# Patient Record
Sex: MALE | Race: WHITE | NOT HISPANIC OR LATINO | Employment: OTHER | URBAN - METROPOLITAN AREA
[De-identification: names, ages, dates, MRNs, and addresses within clinical notes are randomized per-mention and may not be internally consistent; named-entity substitution may affect disease eponyms.]

---

## 2022-09-08 ENCOUNTER — TELEPHONE (OUTPATIENT)
Dept: SURGERY | Facility: CLINIC | Age: 62
End: 2022-09-08

## 2022-09-08 DIAGNOSIS — Z12.11 SCREENING FOR COLON CANCER: Primary | ICD-10-CM

## 2022-09-08 RX ORDER — SODIUM CHLORIDE 0.9 % (FLUSH) 0.9 %
3 SYRINGE (ML) INJECTION
Status: CANCELLED | OUTPATIENT
Start: 2022-09-08

## 2022-09-08 NOTE — TELEPHONE ENCOUNTER
The patient has been advised the Colonoscopy Prep Kit will be ordered from the patient's verified preferred pharmacy on file. The medication can  be picked up by the patient, or the patient's designated representative. The patient was further explained; Colonoscopy Prep instructions will be mailed to the patient verified address on file. The patient has been advised to follow the Colonoscopy Prep instructions being mailed;as precise meticulously as possible. Patient was advise you  will receive a follow up phone call; to summarize the Colonoscopy Prep instructions, prior to the scheduled Colonoscopy procedure date. At this time the patient will be given an opportunity to ask any questions regarding the Colonoscopy Prep and associated Colonoscopy procedure. procedure.

## 2022-09-08 NOTE — TELEPHONE ENCOUNTER
Called patient in reference to a referral to Colorectal Surgery for colon cancer screening. Patient verbally consented to a Colonoscopy and requested to be scheduled for a Colonoscopy on 11/14/2022 - Pending Cardiac Clearance -Patient was advised a designated  is required on the day of the Colonoscopy to drive the patient home and the  must be at least. 18 years old.Colonoscopy Prep instructions were thoroughly explained and discussed with the patient.   It was emphasized, and reiterated to the patient, not to follow the prep instructions that comes with the Prep Kit. However, to please follow the prep instructions that will be received in the mail from the [facility]   Patient acknowledges understanding Prep instructions as explained and discussed on the phone.. Patient was advised the Colonoscopy Prep instructions discussed and explained on the phone,are being mailed out to the patient's verified address on file. Patient's address on file was verified with the patient for accuracy of mailing. Patient's medications on file was reviewed with the patient for accuracy of information. Patient denies taking  any other medications other than those listed and verified on medication profile.Patient was explained the Colonoscopy will be performed here at [facility]. Patient was further explained the Pre-Op will call one day prior to the procedure date, to discuss Pre-Op instructions;and what time to report for the Colonoscopy. The patient was given the opportunity to ask any questions about the Colonoscopy.    Bowel Prep/SUPREP instructions                                               [facility]    [address] [place] [address]      You are scheduled for a Colonoscopy with _______________________ on ____________________   At [facility] in [place].    Check in at the hospital on 1st floor Registration area next to Emergency room.    Please  call [phone] to reschedule or if you have any questions.    An adult friend/family member must come with you to drive you home.  You cannot drive, take a taxi, Uber/Lyft or bus to leave the Hospital alone. If you do not have someone with you to drive you home, your test will be cancelled.       Please follow the directions of your doctor if you take any pills that thin your blood.  If you take these meds: Aggrenox, Brilinta, Effient, Eliquis, Lovenox, Plavix, Pletal Pradaxa ticilid, Xarelto, or Coumadin, let the doctor's office know.    Don't: On the morning of the test do not take insulin or pills for diabetes.   Do: On the morning of the test, do take any pills for blood pressure, heart, anti-rejection and or seizures with a small sip of water. Bring any inhalers with you day of procedure.    To have a good prep, you must follow these instructions- please do not use the directions from the pharmacy!      The doctor will send a prescription for the SUPREP   The day Before the test:   You can only drink CLEAR LIQUIDS the whole day before your test. You can't eat any food for the whole day.    You CAN have:   Water,Coffee or decaf coffee (no milk or cream)    Tea   Soft drinks- regular and sugar free   Jell-O (green or yellow)   Apple Juice, grape juice, white cranberry juice   Gatorade, Power Aid, Crystal Light, Fuad Aid   Lemonade and Limeade   Bouillon, clear soup   Snowball, popsicles   YOU CAN'T DRINK ANYTHING RED   YOU CAN'T DRINK ALCOHOL   ONLY DRINK WHAT IS ON THIS List      At 5pm the night before your test:   Pour the 1st bottle of SUPREP into the cup provided in the box.  Add water to the line on the cup and mix well. Drink the whole cup and then drink 2 more full cups of water over the 1 hour.    This can be easier to drink if it is cold.  You can mix it 20 minutes ahead of time and place in the refrigerator before you drink it.  You must drink it within 30-45 minutes of mixing it. Do NOT pour the  drink over ice. You can drink it with a straw.    The Day of the test- We will call you 1 day before your test to tell you what time to get there.    5 hours before you come to the hospital (this may be the middle of the night)   Pour the 2nd bottle of SUPREP into to the cup provided in the box. Add water to the line on the cup and mix well. Drink the whole cup and then drink 2 more full cups of water over 1 hour.    It might be easier to drink if it is cold. You can mix it 20 minutes ahead of time and place in the refrigerator before you drink it. You must drink it within 30-45 minutes of mixing it. Do NOT pour the drink over ice. You can drink it with a straw.   YOU CAN'T EAT OR DRINK ANYTHING ELSE ONCE YOU FINISH THE PREP.   Leave all valuables and jewelry at home. You will be at the hospital for 2-4 hours.    Call the Endoscopy Scheduling Department at [phone] with any questions about your procedure.    Please  your medication from your local pharmacy. If you are unable to  the SUPREP Kit please contact our office.   Thank you   Endo Scheduling Dept   [facility]

## 2022-09-09 RX ORDER — SODIUM, POTASSIUM,MAG SULFATES 17.5-3.13G
1 SOLUTION, RECONSTITUTED, ORAL ORAL DAILY
Qty: 1 KIT | Refills: 0 | Status: SHIPPED | OUTPATIENT
Start: 2022-09-09 | End: 2022-09-11

## 2022-10-28 ENCOUNTER — TELEPHONE (OUTPATIENT)
Dept: SURGERY | Facility: CLINIC | Age: 62
End: 2022-10-28

## 2022-10-28 NOTE — TELEPHONE ENCOUNTER
Dr. Pebbles Dooley MD    Please provide us with written Cardiac Clearance on Mr Abhinav Kasper.  : 1960.        Please send any test results such as : EKG, Holter Monitor, Echocardiogram and or Stress tests.      Patient will be scheduled for a Colonoscopy  under General/MAC anesthesia scheduled for 2022.  Patient is taking Xarelto 20 MG t=Tab PO daily. Please ascribe to how this patient is to take the afore listed medication Pre,and Post Colonoscopy procedure. Additionally, this patient is noted to have the following Cardio/Vasc- Medical HX:  5 items  Date Unknown  CHF (congestive heart failure)  Date Unknown  Coronary artery disease  Date Unknown  Diabetes mellitus  Date Unknown  Hypertension  Date Unknown  Presence of combination internal cardiac defibrillator (ICD) and pacemaker      Please fax the Clearance results to [phone]  Thank you for your help in this matter.    Sincerely,  Srini Maria  Phone- [phone]  Fax- [phone]

## 2022-11-07 ENCOUNTER — DOCUMENTATION ONLY (OUTPATIENT)
Dept: SURGERY | Facility: CLINIC | Age: 62
End: 2022-11-07

## 2022-11-07 RX ORDER — SODIUM, POTASSIUM,MAG SULFATES 17.5-3.13G
1 SOLUTION, RECONSTITUTED, ORAL ORAL DAILY
Qty: 1 KIT | Refills: 0 | Status: SHIPPED | OUTPATIENT
Start: 2022-11-07 | End: 2022-11-09

## 2022-11-07 NOTE — PROGRESS NOTES
A follow up call was paced to Dr Soumya Rogel's office regarding a cardiac clearance request for this patient to proceed with the scheduled Colonoscopy. I spoke withTae who stated  is out of the office at this time,and scheduled to return on Friday. However Efe will assist with working on getting the cardiac clearance for this patient.

## 2022-11-10 ENCOUNTER — TELEPHONE (OUTPATIENT)
Dept: GASTROENTEROLOGY | Facility: CLINIC | Age: 62
End: 2022-11-10

## 2022-11-11 ENCOUNTER — TELEPHONE (OUTPATIENT)
Dept: SURGERY | Facility: CLINIC | Age: 62
End: 2022-11-11

## 2022-11-11 ENCOUNTER — DOCUMENTATION ONLY (OUTPATIENT)
Dept: SURGERY | Facility: CLINIC | Age: 62
End: 2022-11-11

## 2022-11-11 NOTE — TELEPHONE ENCOUNTER
A follow up phone call was placed to Dr Krissy Rogel's office, re: a request for Cardiac Clearance on this patient,to proceed with the scheduled Colonoscopy on 11/14/2022. I spoke with WINSTON Kennedy who advised Dr Rogel just returned to the office today,and is currently seeing patients. Marcia further added the Cardiac Clearance request is on Dr Rogel's desk,and it will be addressed by Dr Rogel later today. Jaime verified the fax number location here at [facility]  as [phone].

## 2022-11-11 NOTE — PROGRESS NOTES
A phone call was was returned to this patient regarding a time to report for the scheduled Colonoscopy. It was explained to this patient the tentative time to report for the Colonoscopy is for 8:00 am; pending the Cardiac Clearance have been received from Dr Krissy Rogel MD. Additionally it was explained to this patient if the Cardiac Clearance is not received from Dr Rogel, the Colonoscopy will need to be rescheduled. The patient acknowledged understanding of the information discussed regarding the Colonoscopy and the Cardiac Clearance needed to proceed with it.

## 2022-11-11 NOTE — TELEPHONE ENCOUNTER
A call was placed to this patient to advise the Cardiac Clearance was received from Dr Edil Rogel,'s office to proceed with the Colonoscopy scheduled for 11/14/2022. This patient was instructed as follows:Hold XARELTO 3  days before procedure; resume Asap after procedure. The patient was able to repeat the instructions accurately times(2) two,and acknowledge understanding of the instructions. Additionally, this patient was advised to report to [facility]note 2[facility] for 8:00 am for the scheduled Colonoscopy.

## 2022-11-22 ENCOUNTER — TELEPHONE (OUTPATIENT)
Dept: GASTROENTEROLOGY | Facility: CLINIC | Age: 62
End: 2022-11-22

## 2022-11-22 NOTE — TELEPHONE ENCOUNTER
----- Message from Taqueria Hui MD sent at 11/22/2022  8:36 AM CST -----  Pathology from recent colonoscopy reviewed.  Colon polyp(s) benign.  Repeat colonoscopy in 6 months due to inadequate prep.

## 2024-02-02 ENCOUNTER — TELEPHONE (OUTPATIENT)
Dept: GASTROENTEROLOGY | Facility: CLINIC | Age: 64
End: 2024-02-02
Payer: MEDICARE

## 2024-02-02 DIAGNOSIS — Z12.11 SCREENING FOR COLON CANCER: Primary | ICD-10-CM

## 2024-02-02 RX ORDER — SODIUM CHLORIDE 0.9 % (FLUSH) 0.9 %
3 SYRINGE (ML) INJECTION
Status: CANCELLED | OUTPATIENT
Start: 2024-02-02

## 2024-02-02 RX ORDER — SODIUM, POTASSIUM,MAG SULFATES 17.5-3.13G
1 SOLUTION, RECONSTITUTED, ORAL ORAL DAILY
Qty: 1 KIT | Refills: 0 | Status: SHIPPED | OUTPATIENT
Start: 2024-02-02 | End: 2024-02-04

## 2024-02-02 NOTE — TELEPHONE ENCOUNTER
Called patient in reference to a referral to Colorectal Surgery for colon cancer screening. Patient verbally consented to a Colonoscopy and requested to be scheduled for a Colonoscopy on 02/23/2024 Pending Cardiac Clearance - Anticoagulant Therapy in use.- Patient was advised a designated  is required on the day of the Colonoscopy to drive the patient home and the  must be at least. 18 years old.Colonoscopy Prep instructions were thoroughly explained and discussed with the patient.It was emphasized, and reiterated to the patient, to please not to follow the bowel prep instructions that comes with the bowel prep package.However, to please follow the prep instructions that will be received in the mail,or via the [employer] portal, or by both modes of delivery, which ever method of delivery the patient prefers,from the [facility]   Patient acknowledges understanding Prep instructions as explained and discussed on the phone.. Patient was advised the Colonoscopy Prep instructions discussed and explained on the phone,are being mailed out to the patient's verified address on file,or put onto the [employer] portal,or both methods of delivery, whichever the patient prefers. Patient's address on file was verified with the patient for accuracy of mailing. Patient's medications on file was reviewed with the patient for accuracy of information. Patient denies taking  any other medications other than those listed and verified on medication profile.Patient was explained the Colonoscopy will be performed here at [facility]. Patient was further explained the Pre-Op will call one day prior to the procedure date, to discuss Pre-Op instructions;and what time to report for the Colonoscopy. The patient was given the opportunity to ask any questions about the Colonoscopy. No further issues were discussed.

## 2024-02-02 NOTE — TELEPHONE ENCOUNTER
The patient has been advised the Colonoscopy Prep Kit will be ordered from the patient's verified preferred pharmacy on file. The medication can  be picked up by the patient, or the patient's designated representative.The patient was further explained the Colonoscopy Prep instructions will be mailed to the patient verified mailing address on file, or put onto the [employer] portal, whichever method of delivery the patient prefers.Additionally this patient was informed,not to follow the instructions that comes with the bowel prep medication. However, the patient was instructed to please follow the Colonoscopy Bowel Prep instructions that's being provided by the . The patient was asked to please to follow the Colonoscopy Prep instructions being provided as precisely,and  meticulously as possible.The patient was advised you  will receive a follow up phone call to summarize the Colonoscopy Prep instructions prior to the scheduled Colonoscopy procedure date. At this time the patient will be given an opportunity to ask any questions regarding the Colonoscopy procedure, and it's associated Bowel Prep instructions.

## 2024-02-29 ENCOUNTER — TELEPHONE (OUTPATIENT)
Dept: GASTROENTEROLOGY | Facility: CLINIC | Age: 64
End: 2024-02-29
Payer: MEDICARE

## 2024-02-29 NOTE — TELEPHONE ENCOUNTER
----- Message from Lisa Arias MD sent at 2/29/2024  8:02 AM CST -----  Please let the patient know that the polyps removed during his colonoscopy were benign tubular adenomas, repeat colonoscopy in 3 years.

## 2024-07-08 PROBLEM — M25.69 DECREASED ROM OF TRUNK AND BACK: Status: ACTIVE | Noted: 2024-07-08

## 2024-07-08 PROBLEM — M62.81 WEAKNESS OF TRUNK MUSCULATURE: Status: ACTIVE | Noted: 2024-07-08

## 2024-07-08 PROBLEM — R29.898 WEAKNESS OF BOTH LOWER EXTREMITIES: Status: ACTIVE | Noted: 2024-07-08

## 2024-08-19 PROBLEM — M25.69 DECREASED ROM OF TRUNK AND BACK: Status: RESOLVED | Noted: 2024-07-08 | Resolved: 2024-08-19

## 2024-08-19 PROBLEM — M62.81 WEAKNESS OF TRUNK MUSCULATURE: Status: RESOLVED | Noted: 2024-07-08 | Resolved: 2024-08-19

## 2024-08-19 PROBLEM — R29.898 WEAKNESS OF BOTH LOWER EXTREMITIES: Status: RESOLVED | Noted: 2024-07-08 | Resolved: 2024-08-19

## 2024-08-26 ENCOUNTER — HOSPITAL ENCOUNTER (OUTPATIENT)
Dept: RADIOLOGY | Facility: HOSPITAL | Age: 64
Discharge: HOME | End: 2024-08-26
Attending: STUDENT IN AN ORGANIZED HEALTH CARE EDUCATION/TRAINING PROGRAM
Payer: MEDICARE

## 2024-08-26 DIAGNOSIS — G89.29 CHRONIC LOWER BACK PAIN: Primary | ICD-10-CM

## 2024-08-26 DIAGNOSIS — M54.2 CERVICALGIA: ICD-10-CM

## 2024-08-26 DIAGNOSIS — M54.50 CHRONIC LOWER BACK PAIN: ICD-10-CM

## 2024-08-26 DIAGNOSIS — M54.50 LOWER BACK PAIN: ICD-10-CM

## 2024-08-26 DIAGNOSIS — M54.2 CERVICALGIA: Primary | ICD-10-CM

## 2024-08-26 DIAGNOSIS — M54.50 CHRONIC LOWER BACK PAIN: Primary | ICD-10-CM

## 2024-08-26 DIAGNOSIS — G89.29 CHRONIC LOWER BACK PAIN: ICD-10-CM

## 2024-08-26 PROCEDURE — 72052 X-RAY EXAM NECK SPINE 6/>VWS: CPT | Mod: TC

## 2024-08-26 PROCEDURE — 72052 X-RAY EXAM NECK SPINE 6/>VWS: CPT | Mod: 26,,, | Performed by: STUDENT IN AN ORGANIZED HEALTH CARE EDUCATION/TRAINING PROGRAM

## 2024-08-26 PROCEDURE — 72110 X-RAY EXAM L-2 SPINE 4/>VWS: CPT | Mod: 26,,, | Performed by: STUDENT IN AN ORGANIZED HEALTH CARE EDUCATION/TRAINING PROGRAM

## 2024-08-26 PROCEDURE — 72110 X-RAY EXAM L-2 SPINE 4/>VWS: CPT | Mod: TC

## 2024-10-02 ENCOUNTER — HOSPITAL ENCOUNTER (OUTPATIENT)
Dept: RADIOLOGY | Facility: HOSPITAL | Age: 64
Discharge: HOME | End: 2024-10-02
Payer: MEDICARE

## 2024-10-02 VITALS
TEMPERATURE: 98 F | BODY MASS INDEX: 40.89 KG/M2 | WEIGHT: 254.44 LBS | HEIGHT: 66 IN | RESPIRATION RATE: 18 BRPM | SYSTOLIC BLOOD PRESSURE: 118 MMHG | DIASTOLIC BLOOD PRESSURE: 68 MMHG | OXYGEN SATURATION: 99 % | HEART RATE: 70 BPM

## 2024-10-02 DIAGNOSIS — M54.40 LOW BACK PAIN WITH SCIATICA, SCIATICA LATERALITY UNSPECIFIED, UNSPECIFIED BACK PAIN LATERALITY, UNSPECIFIED CHRONICITY: ICD-10-CM

## 2024-10-02 DIAGNOSIS — M54.2 CERVICALGIA: ICD-10-CM

## 2024-10-02 DIAGNOSIS — M54.9 BACK PAIN: ICD-10-CM

## 2024-10-02 LAB — POCT GLUCOSE: 88 MG/DL (ref 70–110)

## 2024-10-02 PROCEDURE — 72126 CT NECK SPINE W/DYE: CPT | Mod: TC

## 2024-10-02 PROCEDURE — 25500020 PHARM REV CODE 255

## 2024-10-02 PROCEDURE — 63600175 PHARM REV CODE 636 W HCPCS

## 2024-10-02 PROCEDURE — 72132 CT LUMBAR SPINE W/DYE: CPT | Mod: TC

## 2024-10-02 RX ORDER — LIDOCAINE HYDROCHLORIDE 10 MG/ML
3 INJECTION, SOLUTION INFILTRATION; PERINEURAL ONCE
Status: COMPLETED | OUTPATIENT
Start: 2024-10-02 | End: 2024-10-02

## 2024-10-02 RX ADMIN — IOHEXOL 10 ML: 300 INJECTION, SOLUTION INTRAVENOUS at 11:10

## 2024-10-02 RX ADMIN — LIDOCAINE HYDROCHLORIDE 3 ML: 10 INJECTION, SOLUTION INFILTRATION; PERINEURAL at 11:10

## 2024-10-02 NOTE — NURSING
Recovery and bedrest period complete. VSS. Procedure site dressing to lumbar spine is clean, dry, and intact. Patient has ambulated and voided without difficulty. Patient verbalizes understanding of discharge instructions including when to call the doctor. PIV removed. Patient discharged home with family.

## 2024-10-02 NOTE — NURSING
Patient received to MPU 4 s/p myelogram. Patient to lay flat until 1300, then discharge home with family. Patient has a slight headache, but no other complaints. VSS. Site CDI.

## 2024-10-02 NOTE — H&P
Radiology History & Physical      SUBJECTIVE:     Chief Complaint: back pain    History of Present Illness:  Abhinav Kasper is a 63 y.o. male who presents for myelogram for the cervical and lumbar spine. Patient has an MRI unsafe cardiac device.     Past Medical History:   Diagnosis Date    CHF (congestive heart failure)     Coronary artery disease     Diabetes mellitus     Hypertension     Presence of combination internal cardiac defibrillator (ICD) and pacemaker      Past Surgical History:   Procedure Laterality Date    ATRIAL CARDIAC PACEMAKER INSERTION      COLONOSCOPY N/A 11/14/2022    Procedure: COLONOSCOPY;  Surgeon: Taqueria Hui MD;  Location: [facility];  Service: Endoscopy;  Laterality: N/A;  polypectomy and tattooing    COLONOSCOPY N/A 2/23/2024    Procedure: COLONOSCOPY;  Surgeon: Lisa Arias MD;  Location: [facility];  Service: Gastroenterology;  Laterality: N/A;  This patient's Colonoscopy has been scheduled pending cardiac clearance - Anticoagulant Therapy in use    COLONOSCOPY W/ POLYPECTOMY  02/23/2024       Home Meds:   Prior to Admission medications    Medication Sig Start Date End Date Taking? Authorizing Provider   albuterol sulfate 2.5 mg/0.5 mL Nebu Take 1.25 mg by nebulization every 4 (four) hours as needed. Rescue 3/12/19 3/11/20  Nathaniel Singletary MD   amiodarone (PACERONE) 100 MG Tab Take by mouth once daily.    Provider, Historical   empagliflozin/metformin HCl (SYNJARDY ORAL) Take 1,000 mg by mouth 2 (two) times a day.    Provider, Historical   fosinopril (MONOPRIL) 40 MG tablet Take 40 mg by mouth once daily.    Provider, Historical   furosemide (LASIX) 40 MG tablet Take 40 mg by mouth 2 (two) times daily.    Provider, Historical   metFORMIN (GLUCOPHAGE) 500 MG tablet Take 500 mg by mouth 2 (two) times daily with meals.    Provider, Historical   metoprolol tartrate (LOPRESSOR) 25 MG tablet Take 25 mg by mouth 2 (two) times daily.    Provider, Historical  "  rivaroxaban (XARELTO) 20 mg Tab Take 20 mg by mouth daily with dinner or evening meal.    Provider, Historical     Anticoagulants/Antiplatelets: no anticoagulation    Allergies:   Review of patient's allergies indicates:   Allergen Reactions    Coumarin analogues      Sedation History:  no adverse reactions    Review of Systems:   Hematological: no known coagulopathies  Respiratory: no shortness of breath  Cardiovascular: no chest pain  Gastrointestinal: no abdominal pain  Genito-Urinary: no dysuria  Musculoskeletal: negative  Neurological: no TIA or stroke symptoms         OBJECTIVE:     Vital Signs (Most Recent)  Temp: 97.7 °F (36.5 °C) (10/02/24 0914)  Pulse: 70 (10/02/24 0914)  Resp: 18 (10/02/24 0914)  BP: 112/67 (10/02/24 0914)  SpO2: 99 % (10/02/24 0914)    Physical Exam:  ASA: 2  Mallampati: 2    General: no acute distress  Mental Status: alert and oriented to person, place and time  HEENT: normocephalic, atraumatic  Chest: unlabored breathing  Heart: regular heart rate  Abdomen: nondistended  Extremity: moves all extremities    Laboratory  No results found for: "INR", "PT", "PTT"    Lab Results   Component Value Date    WBC 8.40 06/22/2021    HGB 13.6 (L) 06/22/2021    HCT 41.2 06/22/2021    MCV 83 06/22/2021     06/22/2021      Lab Results   Component Value Date    GLU 99 06/22/2021     09/10/2021    K 4.4 09/10/2021     06/22/2021    CO2 30 09/10/2021    BUN 14.0 09/10/2021    CREATININE 0.83 09/10/2021    CALCIUM 9.1 09/10/2021    ALT 14 06/22/2021    AST 15 06/22/2021    ALBUMIN 3.7 06/22/2021    BILITOT 0.6 06/22/2021       ASSESSMENT/PLAN:     Sedation Plan: local  Patient will undergo myelogram.    Written consent was obtained from the patient. All questions answered.    Dilan Figueredo MD  Radiology PGY-5  [facility]    "

## 2024-10-02 NOTE — PROCEDURES
Radiology Post-Procedure Note    Pre Op Diagnosis: back pain    Post Op Diagnosis: Same    Procedure: lumbar and cervical myelogram    Procedure performed by: Pierre Redd MD; Dilan Figueredo MD    Written Informed Consent Obtained: Yes    Estimated Blood Loss: Minimal    Findings:  1% lidocaine was injected into the skin and a 22-gauge spinal needle was introduced into the L3-L4 interlaminar space under fluoroscopic guidance.  Position of the tip was confirmed to be in the thecal sac and 10 mL Omnipaque 300 was injected under fluoroscopic surveillance.  Postprocedural images demonstrate satisfactory injection of contrast material and dynamic imaging was performed.  The patient tolerated the procedure well and was transferred to CT for further imaging.      Dilan Figueredo MD  Radiology PGY-5  [facility]

## 2025-07-07 DIAGNOSIS — E11.65 POORLY CONTROLLED DIABETES MELLITUS: Primary | ICD-10-CM
